# Patient Record
Sex: FEMALE | Race: OTHER
[De-identification: names, ages, dates, MRNs, and addresses within clinical notes are randomized per-mention and may not be internally consistent; named-entity substitution may affect disease eponyms.]

---

## 2021-01-16 ENCOUNTER — HOSPITAL ENCOUNTER (INPATIENT)
Dept: HOSPITAL 72 - EMR | Age: 86
LOS: 3 days | Discharge: HOME HEALTH | DRG: 177 | End: 2021-01-19
Payer: MEDICARE

## 2021-01-16 VITALS — SYSTOLIC BLOOD PRESSURE: 101 MMHG | DIASTOLIC BLOOD PRESSURE: 63 MMHG

## 2021-01-16 VITALS — DIASTOLIC BLOOD PRESSURE: 72 MMHG | SYSTOLIC BLOOD PRESSURE: 102 MMHG

## 2021-01-16 VITALS — HEIGHT: 63 IN | WEIGHT: 145 LBS | BODY MASS INDEX: 25.69 KG/M2

## 2021-01-16 VITALS — SYSTOLIC BLOOD PRESSURE: 109 MMHG | DIASTOLIC BLOOD PRESSURE: 67 MMHG

## 2021-01-16 VITALS — DIASTOLIC BLOOD PRESSURE: 83 MMHG | SYSTOLIC BLOOD PRESSURE: 131 MMHG

## 2021-01-16 VITALS — SYSTOLIC BLOOD PRESSURE: 106 MMHG | DIASTOLIC BLOOD PRESSURE: 61 MMHG

## 2021-01-16 VITALS — DIASTOLIC BLOOD PRESSURE: 61 MMHG | SYSTOLIC BLOOD PRESSURE: 110 MMHG

## 2021-01-16 DIAGNOSIS — J12.82: ICD-10-CM

## 2021-01-16 DIAGNOSIS — R09.02: ICD-10-CM

## 2021-01-16 DIAGNOSIS — G89.4: ICD-10-CM

## 2021-01-16 DIAGNOSIS — G93.40: ICD-10-CM

## 2021-01-16 DIAGNOSIS — E87.2: ICD-10-CM

## 2021-01-16 DIAGNOSIS — J45.909: ICD-10-CM

## 2021-01-16 DIAGNOSIS — I10: ICD-10-CM

## 2021-01-16 DIAGNOSIS — K85.90: ICD-10-CM

## 2021-01-16 DIAGNOSIS — I48.91: ICD-10-CM

## 2021-01-16 DIAGNOSIS — U07.1: Primary | ICD-10-CM

## 2021-01-16 LAB
ADD MANUAL DIFF: NO
ADD MANUAL DIFF: NO
ALBUMIN SERPL-MCNC: 3.6 G/DL (ref 3.4–5)
ALBUMIN/GLOB SERPL: 0.8 {RATIO} (ref 1–2.7)
ALP SERPL-CCNC: 83 U/L (ref 46–116)
ALT SERPL-CCNC: 30 U/L (ref 12–78)
ANION GAP SERPL CALC-SCNC: 6 MMOL/L (ref 5–15)
ANION GAP SERPL CALC-SCNC: 6 MMOL/L (ref 5–15)
APPEARANCE UR: CLEAR
APTT BLD: 27 SEC (ref 23–33)
APTT PPP: (no result) S
AST SERPL-CCNC: 49 U/L (ref 15–37)
BASOPHILS NFR BLD AUTO: 1.4 % (ref 0–2)
BASOPHILS NFR BLD AUTO: 2 % (ref 0–2)
BILIRUB SERPL-MCNC: 1 MG/DL (ref 0.2–1)
BUN SERPL-MCNC: 25 MG/DL (ref 7–18)
BUN SERPL-MCNC: 31 MG/DL (ref 7–18)
CALCIUM SERPL-MCNC: 8.9 MG/DL (ref 8.5–10.1)
CALCIUM SERPL-MCNC: 9 MG/DL (ref 8.5–10.1)
CHLORIDE SERPL-SCNC: 102 MMOL/L (ref 98–107)
CHLORIDE SERPL-SCNC: 96 MMOL/L (ref 98–107)
CK MB SERPL-MCNC: 0.9 NG/ML (ref 0–3.6)
CO2 SERPL-SCNC: 32 MMOL/L (ref 21–32)
CO2 SERPL-SCNC: 33 MMOL/L (ref 21–32)
CREAT SERPL-MCNC: 1.2 MG/DL (ref 0.55–1.3)
CREAT SERPL-MCNC: 1.3 MG/DL (ref 0.55–1.3)
EOSINOPHIL NFR BLD AUTO: 1.2 % (ref 0–3)
EOSINOPHIL NFR BLD AUTO: 4.2 % (ref 0–3)
ERYTHROCYTE [DISTWIDTH] IN BLOOD BY AUTOMATED COUNT: 13.5 % (ref 11.6–14.8)
ERYTHROCYTE [DISTWIDTH] IN BLOOD BY AUTOMATED COUNT: 13.8 % (ref 11.6–14.8)
GLOBULIN SER-MCNC: 4.4 G/DL
GLUCOSE UR STRIP-MCNC: NEGATIVE MG/DL
HCT VFR BLD CALC: 46.5 % (ref 37–47)
HCT VFR BLD CALC: 46.5 % (ref 37–47)
HGB BLD-MCNC: 14.6 G/DL (ref 12–16)
HGB BLD-MCNC: 15.5 G/DL (ref 12–16)
INR PPP: 1 (ref 0.9–1.1)
KETONES UR QL STRIP: NEGATIVE
LEUKOCYTE ESTERASE UR QL STRIP: NEGATIVE
LYMPHOCYTES NFR BLD AUTO: 28.2 % (ref 20–45)
LYMPHOCYTES NFR BLD AUTO: 35.4 % (ref 20–45)
MCV RBC AUTO: 94 FL (ref 80–99)
MCV RBC AUTO: 96 FL (ref 80–99)
MONOCYTES NFR BLD AUTO: 3.5 % (ref 1–10)
MONOCYTES NFR BLD AUTO: 9.8 % (ref 1–10)
NEUTROPHILS NFR BLD AUTO: 48.6 % (ref 45–75)
NEUTROPHILS NFR BLD AUTO: 65.7 % (ref 45–75)
NITRITE UR QL STRIP: NEGATIVE
PH UR STRIP: 5 [PH] (ref 4.5–8)
PLATELET # BLD: 169 K/UL (ref 150–450)
PLATELET # BLD: 200 K/UL (ref 150–450)
POTASSIUM SERPL-SCNC: 3.8 MMOL/L (ref 3.5–5.1)
POTASSIUM SERPL-SCNC: 4.9 MMOL/L (ref 3.5–5.1)
PROT UR QL STRIP: NEGATIVE
RBC # BLD AUTO: 4.85 M/UL (ref 4.2–5.4)
RBC # BLD AUTO: 4.94 M/UL (ref 4.2–5.4)
SODIUM SERPL-SCNC: 135 MMOL/L (ref 136–145)
SODIUM SERPL-SCNC: 140 MMOL/L (ref 136–145)
SP GR UR STRIP: 1.01 (ref 1–1.03)
UROBILINOGEN UR-MCNC: NORMAL MG/DL (ref 0–1)
WBC # BLD AUTO: 4.5 K/UL (ref 4.8–10.8)
WBC # BLD AUTO: 8.5 K/UL (ref 4.8–10.8)

## 2021-01-16 PROCEDURE — 83605 ASSAY OF LACTIC ACID: CPT

## 2021-01-16 PROCEDURE — 93005 ELECTROCARDIOGRAM TRACING: CPT

## 2021-01-16 PROCEDURE — 96375 TX/PRO/DX INJ NEW DRUG ADDON: CPT

## 2021-01-16 PROCEDURE — 96365 THER/PROPH/DIAG IV INF INIT: CPT

## 2021-01-16 PROCEDURE — 83690 ASSAY OF LIPASE: CPT

## 2021-01-16 PROCEDURE — 96361 HYDRATE IV INFUSION ADD-ON: CPT

## 2021-01-16 PROCEDURE — 99285 EMERGENCY DEPT VISIT HI MDM: CPT

## 2021-01-16 PROCEDURE — 36415 COLL VENOUS BLD VENIPUNCTURE: CPT

## 2021-01-16 PROCEDURE — 74177 CT ABD & PELVIS W/CONTRAST: CPT

## 2021-01-16 PROCEDURE — 80048 BASIC METABOLIC PNL TOTAL CA: CPT

## 2021-01-16 PROCEDURE — 84443 ASSAY THYROID STIM HORMONE: CPT

## 2021-01-16 PROCEDURE — 83880 ASSAY OF NATRIURETIC PEPTIDE: CPT

## 2021-01-16 PROCEDURE — 85025 COMPLETE CBC W/AUTO DIFF WBC: CPT

## 2021-01-16 PROCEDURE — 82553 CREATINE MB FRACTION: CPT

## 2021-01-16 PROCEDURE — 81003 URINALYSIS AUTO W/O SCOPE: CPT

## 2021-01-16 PROCEDURE — 85610 PROTHROMBIN TIME: CPT

## 2021-01-16 PROCEDURE — 83735 ASSAY OF MAGNESIUM: CPT

## 2021-01-16 PROCEDURE — 85730 THROMBOPLASTIN TIME PARTIAL: CPT

## 2021-01-16 PROCEDURE — 84484 ASSAY OF TROPONIN QUANT: CPT

## 2021-01-16 PROCEDURE — 96367 TX/PROPH/DG ADDL SEQ IV INF: CPT

## 2021-01-16 PROCEDURE — 87040 BLOOD CULTURE FOR BACTERIA: CPT

## 2021-01-16 PROCEDURE — 80053 COMPREHEN METABOLIC PANEL: CPT

## 2021-01-16 PROCEDURE — 70450 CT HEAD/BRAIN W/O DYE: CPT

## 2021-01-16 PROCEDURE — 80307 DRUG TEST PRSMV CHEM ANLYZR: CPT

## 2021-01-16 PROCEDURE — 71045 X-RAY EXAM CHEST 1 VIEW: CPT

## 2021-01-16 RX ADMIN — APIXABAN SCH MG: 5 TABLET, FILM COATED ORAL at 18:24

## 2021-01-16 RX ADMIN — METOPROLOL SUCCINATE SCH MG: 25 TABLET, EXTENDED RELEASE ORAL at 10:02

## 2021-01-16 RX ADMIN — APIXABAN SCH MG: 5 TABLET, FILM COATED ORAL at 10:02

## 2021-01-16 NOTE — NUR
NURSE NOTES:

Received pt from [facility]. pt is resting comfortably no acute distress, call light w/in 
reach.

## 2021-01-16 NOTE — NUR
NURSE HAND-OFF: 



Important Events on Shift:admission

Patient Status: stable

Diet: reg,soft



Pending Orders: 

Pending Results/Labs:am labs

Pending MD notification:



Latest Vital Signs: Temperature 97.2 , Pulse 90 , B/P 106 /61 , Respiratory Rate 18 , O2 SAT 
97 , Room Air, O2 Flow Rate .  

Vital Sign Comment: []



Latest Plummer Fall Score: 15  

Fall Risk: Low Risk 

Safety Measures: Call light Within Reach, Bed Alarm Zone 1, Side Rails Side Rails x2, Bed 
position Low and Locked.

Fall Precautions: 

Yellow Socks

Yellow Gown

Door Sign

Patient Fall Education



Report given to [Jose].

## 2021-01-16 NOTE — DIAGNOSTIC IMAGING REPORT
EXAM:

  XR Chest, 1 View

 

CLINICAL HISTORY:

  AMS

 

TECHNIQUE:

  Frontal view of the chest.

 

COMPARISON:

  No relevant prior studies available.

 

FINDINGS:

  

Borderline cardiac enlargement.  Patchy densities right lung base 

indeterminate for atelectasis or pneumonia.  Follow-up to resolution 

recommended.  Recommend upright PA and lateral views of the chest when 

clinically feasible.  No evidence of pneumothorax or significant pleural 

fluid collections.

## 2021-01-16 NOTE — DIAGNOSTIC IMAGING REPORT
EXAM:

  CT Head Without Intravenous Contrast

 

CLINICAL HISTORY:

  AMS

 

TECHNIQUE:

  Axial computed tomography images of the head/brain without intravenous 

contrast.  CTDI is 53.40 mGy and DLP is 992.10 mGy-cm.  One or more of 

the following dose reduction techniques were used: automated exposure 

control, adjustment of the mA and/or kV according to patient size, use of 

iterative reconstruction technique.

 

COMPARISON:

  No relevant prior studies available.

 

FINDINGS:

  Brain:  Low-attenuation in the periventricular white matter likely 

chronic small vessel disease.  No evidence of mass-effect or intracranial 

hemorrhage.

  Ventricles:  Unremarkable.  No ventriculomegaly.

  Bones/joints:  Unremarkable.  No acute fracture.

  Soft tissues:  Unremarkable.

  Sinuses:  Unremarkable as visualized.  No acute sinusitis.

  Mastoid air cells:  Unremarkable as visualized.  No mastoid effusion.

  Other findings:  Motion degraded study.

 

IMPRESSION:     

  No acute intracranial findings

## 2021-01-16 NOTE — NUR
NURSE NOTES:

Received report from [facility]. Pt arrived @ 0330 via cammie, on RA, ambulatory. AAO x 
2-3, communication barrier. Denies pain or SOB at this time. Home meds and belongings 
reviewed. IV in place and patent. Orientation to the room given. Left message Dr. Osorio 
for admission order. Bed locked, lowest position, alarm on, side rails up, call light within 
reach. Will continue to monitor.

## 2021-01-16 NOTE — NUR
NURSE NOTES:

Received patient on bed, awake. on room air, sating 94%. denies any pain or discomfort. iv 
access on left wrist running ivf as ordered. per RISHABH porter, " she took out iv access twice 
when she goes to the restroom".ambulates. reiterated to call and ask for assistance to 
prevent fall or injury. kept head of bed, elevated. bed locked and in lowest position. bed 
alarm on call light and light button within easy reach. will continue plan of care.

## 2021-01-16 NOTE — DIAGNOSTIC IMAGING REPORT
EXAM:

  CT Abdomen and Pelvis With Intravenous Contrast

 

CLINICAL HISTORY:

  PAIN

 

TECHNIQUE:

  Axial computed tomography images of the abdomen and pelvis with 

intravenous contrast.  CTDI is 5.7 mGy and DLP is 279.1 mGy-cm.  One or 

more of the following dose reduction techniques were used: automated 

exposure control, adjustment of the mA and/or kV according to patient 

size, use of iterative reconstruction technique.

 

COMPARISON:

  No relevant prior studies available.

 

FINDINGS:

  Artifacts:  Motion degraded study.

  Lung bases:  At least 2 subcentimeter pulmonary nodules in the superior 

portion of the right upper lobe with the largest measuring approximately 

3 mm.  Of unknown etiology or clinical significance in this elderly 

patient.  Indeterminate low-attenuation lesion lateral periphery of the 

inferior right hepatic lobe measuring 22 x 29 mm.  Finding does not meet 

criteria for a simple cyst.  At least 2 additional subcentimeter low-

attenuation foci in the right hepatic lobe.  A 12 mm low-attenuation 

circumscribed focus posterior left hepatic lobe.  Recommend consideration 

of nonemergent hepatic protocol CT or MRI.

 

 ABDOMEN:

  Liver:  See above.

  Gallbladder and bile ducts:  Unremarkable.  No calcified stones.  No 

ductal dilation.

  Pancreas:  Moderate pancreatic ductal dilatation measuring up to 7 mm.  

Mass of the pancreas is not identified.  Pancreatic protocol MRI can 

further evaluate.

  Spleen:  Unremarkable.  No splenomegaly.

  Adrenals:  Unremarkable.  No mass.

  Kidneys and ureters:  Nonobstructing right lower renal pole calculus.

  Stomach and bowel:  Diverticulosis of the colon without associated 

adjacent inflammatory changes.  Negative for obstruction or pneumatosis.  

No mucosal thickening.

 

 PELVIS:

  Appendix:  The appendix is normal.

  Bladder:  Unremarkable.  No mass.

  Reproductive:  Unremarkable as visualized.

 

 ABDOMEN and PELVIS:

  Intraperitoneal space:  Unremarkable.  No free air.  No significant 

fluid collection.

  Bones/joints:  No acute fracture.  No dislocation.

  Soft tissues:  Unremarkable.

  Vasculature:  Unremarkable.  No abdominal aortic aneurysm.

  Lymph nodes:  Unremarkable.  No enlarged lymph nodes.

 

IMPRESSION:     

  No acute findings.  Indeterminate hepatic lesions with follow-up as 

above.  There is also indeterminate prominent pancreatic ductal 

dilatation as above.

## 2021-01-16 NOTE — NUR
TRANSFER TO FLOOR:

Patient transferred to  as ordered, per ER MD.   Report given to Cathryn KEATING.  
Belongings and medications sent with pt. Vitals are stable on RA.

## 2021-01-16 NOTE — EMERGENCY ROOM REPORT
History of Present Illness


General


Chief Complaint:  Altered Mental Status


Source:  Patient





Present Illness


HPI


89-year-old female with history of atrial fibrillation and chronic abdominal 

pain here with altered mental status.  Patient takes tramadol and methadone 

prescribed for abdominal pain.  Also takes Eliquis and metoprolol.  According to

family the patient has been lethargic and altered for the past several hours.  

They state that she did take her pain medications tonight.  Patient is awake but

drowsy.  She is complaining of abdominal pain that is consistent with her 

chronic abdominal pain.  Denies any headache, vision changes, neck pain, neck 

stiffness, fevers, chills, chest pain, palpitation, shortness of breath, back 

pain, nausea, vomiting, diarrhea, dysuria.


Allergies:  


Coded Allergies:  


     No Known Allergies (Unverified , 1/16/21)





COVID-19 Screening


Contact w/high risk pt:  No


Experienced COVID-19 symptoms?:  No


COVID-19 Testing performed PTA:  No





Nursing Documentation-PMH


Hx Hypertension:  Yes


Hx Asthma:  Yes





Review of Systems


All Other Systems:  negative except mentioned in HPI





Physical Exam





Vital Signs








  Date Time  Temp Pulse Resp B/P (MAP) Pulse Ox O2 Delivery O2 Flow Rate FiO2


 


1/16/21 00:11 98.4 105 12 131/83 (99) 98 Room Air  








Sp02 EP Interpretation:  reviewed, normal


General Appearance:  no apparent distress, non-toxic, other - Slightly drowsy 

but is arousable answers questions appropriately


Head:  normocephalic, atraumatic


Eyes:  bilateral eye normal inspection, bilateral eye PERRL


ENT:  hearing grossly normal, normal pharynx, no angioedema, normal voice


Neck:  full range of motion, supple/symm/no masses


Respiratory:  chest non-tender, lungs clear, normal breath sounds, speaking full

sentences


Cardiovascular #1:  regular rate, rhythm, no edema


Cardiovascular #2:  2+ carotid (R), 2+ carotid (L), 2+ radial (R), 2+ radial 

(L), 2+ dorsalis pedis (R), 2+ dorsalis pedis (L)


Gastrointestinal:  normal bowel sounds, soft, non-distended, no guarding, no 

rebound, other - Mild epigastric abdominal tenderness on palpation without any 

rebound or guarding or distention


Rectal:  deferred


Genitourinary:  normal inspection, no CVA tenderness


Musculoskeletal:  back normal, normal range of motion, gait/station normal, non-

tender


Neurologic:  alert, motor strength/tone normal, oriented x3, sensory intact, 

responsive, speech normal


Psychiatric:  judgement/insight normal, memory normal, mood/affect normal, no 

suicidal/homicidal ideation


Lymphatic:  no adenopathy





Medical Decision Making


Diagnostic Impression:  


   Primary Impression:  


   Altered mental status


   Additional Impressions:  


   Pneumonia


   Afib


ER Course


Chest x-ray: Patchy densities right lung base indeterminate for atelectasis or 

pneumonia.  Borderline cardiac enlargement.  No pneumothorax or pleural effusion





EKG: Atrial fibrillation, rate 102 bpm.  Rightward axis.  No ischemia, intervals

WNL. No ectopy


Rhythm strip: patient monitored for arrhythmias - no malignant dysrhythmias, 

runs of PVCs, nor pauses noted





CT head: No acute intracranial findings





CT abdomen pelvis: No acute findings.  Indeterminate hepatic lesions with 

follow-up as above.  There is also indeterminant prominent pancreatic ductal 

dilatation as above








Laboratory Tests








Test


 1/16/21


00:20 1/16/21


00:50


 


White Blood Count


 8.5 K/UL


(4.8-10.8) 





 


Red Blood Count


 4.94 M/UL


(4.20-5.40) 





 


Hemoglobin


 15.5 G/DL


(12.0-16.0) 





 


Hematocrit


 46.5 %


(37.0-47.0) 





 


Mean Corpuscular Volume 94 FL (80-99)   


 


Mean Corpuscular Hemoglobin


 31.4 PG


(27.0-31.0)  H 





 


Mean Corpuscular Hemoglobin


Concent 33.4 G/DL


(32.0-36.0) 





 


Red Cell Distribution Width


 13.5 %


(11.6-14.8) 





 


Platelet Count


 200 K/UL


(150-450) 





 


Mean Platelet Volume


 9.5 FL


(6.5-10.1) 





 


Neutrophils (%) (Auto)


 48.6 %


(45.0-75.0) 





 


Lymphocytes (%) (Auto)


 35.4 %


(20.0-45.0) 





 


Monocytes (%) (Auto)


 9.8 %


(1.0-10.0) 





 


Eosinophils (%) (Auto)


 4.2 %


(0.0-3.0)  H 





 


Basophils (%) (Auto)


 2.0 %


(0.0-2.0) 





 


Prothrombin Time


 11.3 SEC


(9.30-11.50) 





 


Prothrombin Time INR 1.0 (0.9-1.1)   


 


Activated Partial


Thromboplast Time 27 SEC (23-33)


 





 


Sodium Level


 135 MMOL/L


(136-145)  L 





 


Potassium Level


 4.9 MMOL/L


(3.5-5.1) 





 


Chloride Level


 96 MMOL/L


()  L 





 


Carbon Dioxide Level


 33 MMOL/L


(21-32)  H 





 


Anion Gap


 6 mmol/L


(5-15) 





 


Blood Urea Nitrogen


 31 mg/dL


(7-18)  H 





 


Creatinine


 1.3 MG/DL


(0.55-1.30) 





 


Estimated Glomerular


Filtration Rate 38.6 mL/min


(>60) 





 


Glucose Level


 120 MG/DL


()  H 





 


Lactic Acid Level


 2.40 mmol/L


(0.4-2.0)  H 





 


Calcium Level


 8.9 MG/DL


(8.5-10.1) 





 


Magnesium Level


 2.1 MG/DL


(1.8-2.4) 





 


Total Bilirubin


 1.0 MG/DL


(0.2-1.0) 





 


Aspartate Amino Transferase


(AST) 49 U/L (15-37)


H 





 


Alanine Aminotransferase (ALT)


 30 U/L (12-78)


 





 


Alkaline Phosphatase


 83 U/L


() 





 


Creatine Kinase MB


 0.9 NG/ML


(0.0-3.6) 





 


Troponin I


 0.004 ng/mL


(0.000-0.056) 





 


Pro-B-Type Natriuretic Peptide


 796 pg/mL


(0-125)  H 





 


Total Protein


 8.0 G/DL


(6.4-8.2) 





 


Albumin


 3.6 G/DL


(3.4-5.0) 





 


Globulin 4.4 g/dL   


 


Albumin/Globulin Ratio


 0.8 (1.0-2.7)


L 





 


Lipase


 680 U/L


()  H 





 


Urine Color  Pale yellow  


 


Urine Appearance  Clear  


 


Urine pH  5 (4.5-8.0)  


 


Urine Specific Gravity


 


 1.010


(1.005-1.035)


 


Urine Protein


 


 Negative


(NEGATIVE)


 


Urine Glucose (UA)


 


 Negative


(NEGATIVE)


 


Urine Ketones


 


 Negative


(NEGATIVE)


 


Urine Blood


 


 Negative


(NEGATIVE)


 


Urine Nitrite


 


 Negative


(NEGATIVE)


 


Urine Bilirubin


 


 Negative


(NEGATIVE)


 


Urine Urobilinogen


 


 Normal MG/DL


(0.0-1.0)


 


Urine Leukocyte Esterase


 


 Negative


(NEGATIVE)


 


Urine Opiates Screen


 


 Negative


(NEGATIVE)


 


Urine Barbiturates Screen


 


 Negative


(NEGATIVE)


 


Phencyclidine (PCP) Screen


 


 Negative


(NEGATIVE)


 


Urine Amphetamines Screen


 


 Negative


(NEGATIVE)


 


Urine Benzodiazepines Screen


 


 Negative


(NEGATIVE)


 


Urine Cocaine Screen


 


 Negative


(NEGATIVE)


 


Urine Marijuana (THC) Screen


 


 Negative


(NEGATIVE)








89-year-old female here with drowsiness.  Review of the patient's cures shows 

that the patient frequently takes Norco and methadone multiple times a day for 

her chronic abdominal pain.  Patient was mildly drowsy on arrival to the 

emergency department but after several hours this resolved without any acute 

intervention.  Patient had a mildly elevated lactate of 2.4.  Chest x-ray also 

showed a right lower lobe infiltrate concerning for pneumonia.  Patient was 

given a full 30 cc/kg fluid bolus and was started on ceftriaxone and 

azithromycin for community-acquired pneumonia.  Patient has not recently been 

admitted to a hospital.  She has a history of atrial fibrillation on Eliquis.  

EKG showed atrial fibrillation with a normal heart rate.  Patient was found to 

be post Covid positive.  Was given Decadron.  Had normal oxygen saturation and 

otherwise normal vital signs throughout her stay in the emergency department.  

Patient had normal vital signs throughout her stay in the emergency department. 

To be admitted to [facility].





Last Vital Signs








  Date Time  Temp Pulse Resp B/P (MAP) Pulse Ox O2 Delivery O2 Flow Rate FiO2


 


1/16/21 00:11 98.4 105 12 131/83 (99) 98 Room Air  

















Eugene Gates M.D.                Jan 16, 2021 00:18

## 2021-01-16 NOTE — HISTORY & PHYSICAL
History and Physical


History & Physicial


89-year-old female with history of atrial fibrillation presents with lethargy 

and ALOC.  Patient awake and comfortable when seen.  She has chronic abdominal 

pain.  Denies any headache, vision changes, neck pain, neck stiffness, fevers, 

chills, chest pain, palpitation, shortness of breath, back pain, nausea, v

omiting, diarrhea, dysuria. COVID + in ER








PMH hypertension and Asthma, atrial fibrillation,chronic pain syndrome





MEDS/ALLERGIES: reviewed and reconciled


SOCIAL HISTORY: nonsmoker nondrinker


PHYSICAL


deferred due to COVID





Laboratory Tests


1/16/21 00:20: 


White Blood Count 8.5, Red Blood Count 4.94, Hemoglobin 15.5, Hematocrit 46.5, 

Mean Corpuscular Volume 94, Mean Corpuscular Hemoglobin 31.4H, Mean Corpuscular 

Hemoglobin Concent 33.4, Red Cell Distribution Width 13.5, Platelet Count 200, 

Mean Platelet Volume 9.5, Neutrophils (%) (Auto) 48.6, Lymphocytes (%) (Auto) 

35.4, Monocytes (%) (Auto) 9.8, Eosinophils (%) (Auto) 4.2H, Basophils (%) 

(Auto) 2.0, Prothrombin Time 11.3, Prothromb Time International Ratio 1.0, 

Activated Partial Thromboplast Time 27, Sodium Level 135L, Potassium Level 4.9, 

Chloride Level 96L, Carbon Dioxide Level 33H, Anion Gap 6, Blood Urea Nitrogen 

31H, Creatinine 1.3, Estimat Glomerular Filtration Rate 38.6, Glucose Level 120H

, Lactic Acid Level 2.40H, Calcium Level 8.9, Magnesium Level 2.1, Total 

Bilirubin 1.0, Aspartate Amino Transf (AST/SGOT) 49H, Alanine Aminotransferase 

(ALT/SGPT) 30, Alkaline Phosphatase 83, Creatine Kinase MB 0.9, Troponin I 

0.004, Pro-B-Type Natriuretic Peptide 796H, Total Protein 8.0, Albumin 3.6, 

Globulin 4.4, Albumin/Globulin Ratio 0.8L, Lipase 680H


1/16/21 00:50: 


Urine Color Pale yellow, Urine Appearance Clear, Urine pH 5, Urine Specific 

Gravity 1.010, Urine Protein Negative, Urine Glucose (UA) Negative, Urine 

Ketones Negative, Urine Blood Negative, Urine Nitrite Negative, Urine Bilirubin 

Negative, Urine Urobilinogen Normal, Urine Leukocyte Esterase Negative, Urine Op

iates Screen Negative, Urine Barbiturates Screen Negative, Phencyclidine (PCP) 

Screen Negative, Urine Amphetamines Screen Negative, Urine Benzodiazepines 

Screen Negative, Urine Cocaine Screen Negative, Urine Marijuana (THC) Screen 

Negative


1/16/21 03:00: Lactic Acid Level 1.00


1/16/21 08:20: 


White Blood Count 4.5L, Red Blood Count 4.85, Hemoglobin 14.6, Hematocrit 46.5, 

Mean Corpuscular Volume 96, Mean Corpuscular Hemoglobin 30.1, Mean Corpuscular 

Hemoglobin Concent 31.4L, Red Cell Distribution Width 13.8, Platelet Count 169, 

Mean Platelet Volume 8.2, Neutrophils (%) (Auto) 65.7, Lymphocytes (%) (Auto) 

28.2, Monocytes (%) (Auto) 3.5, Eosinophils (%) (Auto) 1.2, Basophils (%) (Auto)

1.4, Sodium Level 140, Potassium Level 3.8, Chloride Level 102, Carbon Dioxide 

Level 32, Anion Gap 6, Blood Urea Nitrogen 25H, Creatinine 1.2, Estimat 

Glomerular Filtration Rate 42.3, Glucose Level 152H, Calcium Level 9.0








IMPRESSION


COVID pneumonia


hypertension


Asthma


? pancreatitis


elevated BNP


lactic acidemia


ho Atrial fib








PLAN


Decadron


consider GI


repeat amylase in am


ID to see


oxygen as needed


monitor oxygen needs


DVT prophylaxis


home meds


nutrition as able


monitor labs and imaging











Sylvester Osorio MD           Jan 16, 2021 15:43

## 2021-01-16 NOTE — NUR
ED Nurse Note: Pt BIBA R[facility]vin[facility] from home, Per EMS report pt OD on methadone 
unknown amount. No medications given in field. Upon arrival pt is awake and 
drowsy, unable to assess orientation pt does not completely answer questions, 
cooperative with care able to follow directions. Pt is breathing even and 
unlabored. VSS on monitor.

## 2021-01-17 VITALS — SYSTOLIC BLOOD PRESSURE: 120 MMHG | DIASTOLIC BLOOD PRESSURE: 82 MMHG

## 2021-01-17 VITALS — SYSTOLIC BLOOD PRESSURE: 99 MMHG | DIASTOLIC BLOOD PRESSURE: 57 MMHG

## 2021-01-17 VITALS — DIASTOLIC BLOOD PRESSURE: 79 MMHG | SYSTOLIC BLOOD PRESSURE: 115 MMHG

## 2021-01-17 VITALS — SYSTOLIC BLOOD PRESSURE: 145 MMHG | DIASTOLIC BLOOD PRESSURE: 78 MMHG

## 2021-01-17 VITALS — DIASTOLIC BLOOD PRESSURE: 63 MMHG | SYSTOLIC BLOOD PRESSURE: 117 MMHG

## 2021-01-17 RX ADMIN — LORAZEPAM PRN MG: 1 TABLET ORAL at 09:40

## 2021-01-17 RX ADMIN — DEXAMETHASONE SODIUM PHOSPHATE SCH MG: 10 INJECTION INTRAMUSCULAR; INTRAVENOUS at 08:45

## 2021-01-17 RX ADMIN — APIXABAN SCH MG: 5 TABLET, FILM COATED ORAL at 19:30

## 2021-01-17 RX ADMIN — LORAZEPAM PRN MG: 1 TABLET ORAL at 20:20

## 2021-01-17 RX ADMIN — METOPROLOL SUCCINATE SCH MG: 25 TABLET, EXTENDED RELEASE ORAL at 08:32

## 2021-01-17 RX ADMIN — LORAZEPAM PRN MG: 1 TABLET ORAL at 14:01

## 2021-01-17 RX ADMIN — SODIUM CHLORIDE SCH MLS/HR: 0.9 INJECTION INTRAVENOUS at 01:08

## 2021-01-17 RX ADMIN — APIXABAN SCH MG: 5 TABLET, FILM COATED ORAL at 08:33

## 2021-01-17 NOTE — NUR
nurse notes

transferred to  411, for safety

refused IVF 

Notified daughter made aware regarding patient condition

left message to PMD regarding  patient condition,



chadwick mix

## 2021-01-17 NOTE — NUR
NURSE NOTES:

failed to hang ivf. attempted x2.  patient is restless. moves around her upper extremities 
and kicks on the bed. reality orientation rendered. provided a calm environment and decrease 
stimuli.

## 2021-01-17 NOTE — PULMONOLOGY PROGRESS NOTE
Subjective


ROS Limited/Unobtainable:  No


Allergies:  


Coded Allergies:  


     No Known Allergies (Unverified , 1/16/21)


All Systems:  reviewed and negative except above


Subjective


agitated


noncooperative


walking around





Objective





Last 24 Hour Vital Signs








  Date Time  Temp Pulse Resp B/P (MAP) Pulse Ox O2 Delivery O2 Flow Rate FiO2


 


1/17/21 12:00 97.6 88 17 115/79 (91) 96   


 


1/17/21 09:40  94 19 117/63 96   


 


1/17/21 08:32  94  117/63    


 


1/17/21 08:20 98.7 102 19 145/78 (100) 96   


 


1/17/21 08:20      Nasal Cannula 2.0 


 


1/17/21 04:00 98.1 94 20 117/63 (81) 96   


 


1/17/21 00:00 97.7 86 19 99/57 (71) 95   


 


1/16/21 21:00      Nasal Cannula 2.0 


 


1/16/21 20:00 97.9 93 18 101/63 (76) 95   


 


1/16/21 15:56 97.3 90 18 109/67 (81) 93   

















Intake and Output  


 


 1/16/21 1/17/21





 19:00 07:00


 


Intake Total 800 ml 


 


Balance 800 ml 


 


  


 


Intake Oral 800 ml 


 


# Voids  2








Objective


deferred due to COVID





Microbiology








 Date/Time


Source Procedure


Growth Status





 


 1/16/21 02:00


Nasopharynx SARS-CoV-2 RdRp Gene Assay - Final Complete


 


 1/16/21 00:20


Blood Blood Culture - Preliminary


NO GROWTH AFTER 24 HOURS Resulted


 


 1/16/21 00:15


Blood Blood Culture - Preliminary


NO GROWTH AFTER 24 HOURS Resulted











Current Medications








 Medications


  (Trade)  Dose


 Ordered  Sig/Keke


 Route


 PRN Reason  Start Time


 Stop Time Status Last Admin


Dose Admin


 


 Acetaminophen


  (Tylenol)  650 mg  Q4H  PRN


 ORAL


 For Pain  1/16/21 07:15


 2/15/21 07:14   





 


 Al Hydroxide/Mg


 Hydroxide


  (Mylanta)  30 ml  Q4H  PRN


 ORAL


 STOMACH UPSET  1/16/21 07:15


 2/15/21 07:14   





 


 Apixaban


  (Eliquis)  5 mg  BID


 ORAL


   1/16/21 09:00


 4/16/21 08:59  1/17/21 08:33





 


 Ceftriaxone


 Sodium 1 gm/


 Dextrose  55 ml @ 


 110 mls/hr  Q24H


 IVPB


   1/17/21 02:00


 1/24/21 01:59  1/17/21 01:08





 


 Dexamethasone


 Sodium Phosphate


  (Decadron 10mg/


 ml Inj)  6 mg  DAILY


 IV


   1/17/21 09:00


 1/25/21 09:01  1/17/21 08:45





 


 Iohexol


  (OMNIPAQUE-300


 100ml)  100 ml  NOW  PRN


 INJ


 Radiology Procedure  1/16/21 00:30


 1/18/21 00:29   





 


 Lorazepam


  (Ativan)  1 mg  Q4H  PRN


 ORAL


 For Anxiety  1/17/21 09:30


 1/24/21 09:29  1/17/21 09:40





 


 Methadone HCl


  (Methadone HCl)  10 mg  DAILY


 ORAL


   1/16/21 09:00


 1/23/21 08:59  1/17/21 08:33





 


 Metoprolol


 Succinate


  (Toprol XL)  25 mg  DAILY


 ORAL


   1/16/21 09:00


 4/16/21 08:59  1/17/21 08:32





 


 Pantoprazole


  (Protonix)  40 mg  DAILY


 ORAL


   1/16/21 09:00


 2/15/21 08:59  1/17/21 08:32





 


 Sodium Chloride  1,000 ml @ 


 100 mls/hr  Q10H


 IV


   1/16/21 07:15


 2/15/21 07:14  1/17/21 03:43














Assessment/Plan


Assessment/Plan





IMPRESSION


COVID pneumonia


hypertension


Asthma


? pancreatitis


elevated BNP


lactic acidemia


ho Atrial fib








PLAN


Decadron


consider GI pending repeat labs


repeat amylase and lipase


ID follow up 


oxygen as needed


monitor oxygen needs


DVT prophylaxis


home meds


nutrition as able


monitor labs and imaging


ativan PRN 


sitter


impression, plan, and exam edited and reviewed in detail


care discussed with Sylvester Sprague MD           Jan 17, 2021 13:40

## 2021-01-17 NOTE — NUR
nurse notes

patient very agitated, confused, pulling HL, water all over the floor nearly fall,  holding 
her water pitcher, notified Dr Osorio awaiting for his order



chadwick mix

## 2021-01-17 NOTE — NUR
NURSE HAND-OFF: 



Important Events on Shift:[on bilateral soft wrist restraint, ]

Patient Status: [no change]

Diet: [regular soft easy  chew]



Pending Orders: [labs ]

Pending Results/Labs:[none]

Pending MD notification:[none]



Latest Vital Signs: Temperature 97.6 , Pulse 88 , B/P 115 /79 , Respiratory Rate 17 , O2 SAT 
96 , Room Air, O2 Flow Rate 2.0 .  

Vital Sign Comment: [stable]



Latest Plummer Fall Score: 60  

Fall Risk: High Risk 

Safety Measures: Call light Within Reach, Bed Alarm Zone 1, Side Rails Side Rails x2, Bed 
position Low and Locked.

Fall Precautions: 

Yellow Socks

Yellow Gown

Door Sign

Patient Fall Education



Report given to [Ms Akshat RN].

## 2021-01-17 NOTE — NUR
nurse notes

still patient very agitated inspite of giving ativan and  have a sitter but still patient 
wants to go  outside her room, notified Dr Osorio , with order lets try putting her with 
bilateral soft wrist restraint



maggarao

-------------------------------------------------------------------------------

Addendum: 01/17/21 at 1917 by CHARLOTTE MASON RN RN

-------------------------------------------------------------------------------

patient stilll agitated despite of all those preventing measures ,taking ativan and  have a 
sitter, still  wants to go  outside of her room, notified Dr Osorio , with order ''lets try 
putting her with vest or bilateral soft wrist restraint, bilateral soft wrist restraint 
applied



dominguez

## 2021-01-17 NOTE — NUR
NURSE NOTES

Patient very agitated ativan given as ordered, post 30 min patient less agitation noted



chadwick mix

## 2021-01-17 NOTE — NUR
NURSE NOTES:

Received patient on bed, asleep. on room air, sating 96%. no facial grimacing. or moaning 
noted.  iv access on left wrist. per RISHABH guo, " patient is confused and walks around inside 
her room and hallway." noted with bilateral soft wrist restraints, no injury noted.  bed 
locked and in lowest position. bed alarm on. call light and light button within easy reach. 
will continue plan of care.

## 2021-01-17 NOTE — NUR
NURSE NOTES;

went to the room. patient is sitting on the edge of the bed, trying to remove her gown. 
bilateral soft wrist  restraints are off.  assisted patient to go back to bed. put back 
restraints. reality orientation rendered. offered fluids. ativan given as ordered.  bed 
locked  and in lowest position. call light and light button within easy reach. bed alarm on.

## 2021-01-17 NOTE — NUR
NURSE NOTES:

Received patient sitting on the edge of the  bed, awake,confused,  on room air,no sign of 
distress, RA, HL  patent, not hooked to IVF, Patient saying she wants to go home, and asking 
where is her money, reality orientation provided. reiterated to call and ask for assistance 
to prevent fall or injury. kept head of bed, elevated. bed locked and in lowest position. 
bed alarm on call light and light button within easy reach. will continue plan of care. 



chadwick mix

## 2021-01-17 NOTE — GENERAL PROGRESS NOTE
Subjective


ROS Limited/Unobtainable:  No


Constitutional:  Reports: malaise, weakness


HEENT:  Reports: no symptoms


Cardiovascular:  Reports: no symptoms


Respiratory:  Reports: no symptoms


Gastrointestinal/Abdominal:  Reports: no symptoms


Genitourinary:  Reports: no symptoms


Neurologic/Psychiatric:  Reports: no symptoms


Endocrine:  Reports: no symptoms


Hematologic/Lymphatic:  Reports: no symptoms


Allergies:  


Coded Allergies:  


     No Known Allergies (Unverified , 1/16/21)


All Systems:  reviewed and negative except above


Subjective


no complaints. confused. states shes going home. ambulating around the room. 

sats nml on room air





Objective





Last 24 Hour Vital Signs








  Date Time  Temp Pulse Resp B/P (MAP) Pulse Ox O2 Delivery O2 Flow Rate FiO2


 


1/17/21 08:32  94  117/63    


 


1/17/21 08:20 98.7 102 19 145/78 (100) 96   


 


1/17/21 08:20      Nasal Cannula 2.0 


 


1/17/21 04:00 98.1 94 20 117/63 (81) 96   


 


1/17/21 00:00 97.7 86 19 99/57 (71) 95   


 


1/16/21 21:00      Nasal Cannula 2.0 


 


1/16/21 20:00 97.9 93 18 101/63 (76) 95   


 


1/16/21 15:56 97.3 90 18 109/67 (81) 93   


 


1/16/21 12:00 97.0 80 18 102/72 (82) 94   


 


1/16/21 10:02  81  110/62    

















Intake and Output  


 


 1/16/21 1/17/21





 19:00 07:00


 


Intake Total 800 ml 


 


Balance 800 ml 


 


  


 


Intake Oral 800 ml 


 


# Voids  2








Height (Feet):  5


Height (Inches):  3.00


Weight (Pounds):  145


General Appearance:  WD/WN, alert


Neck:  supple


Cardiovascular:  normal rate, regular rhythm


Respiratory/Chest:  chest wall non-tender, lungs clear, normal breath sounds, no

respiratory distress, no accessory muscle use


Abdomen:  normal bowel sounds, non tender, soft, no organomegaly


Edema:  no edema noted Arm (L), no edema noted Arm (R), no edema noted Leg (L), 

no edema noted Leg (R)


Neurologic:  alert, responsive





Assessment/Plan


Problem List:  


(1) COVID-19


ICD Codes:  U07.1 - COVID-19


SNOMED:  303341240


(2) Altered mental status


ICD Codes:  R41.82 - Altered mental status, unspecified


SNOMED:  979346890


(3) Pneumonia


ICD Codes:  J18.9 - Pneumonia, unspecified organism


SNOMED:  554679006


(4) Afib


ICD Codes:  I48.91 - Unspecified atrial fibrillation


SNOMED:  21743237


Status:  stable


Assessment/Plan:


o2 as needed


steroids


ID eval


?dc methaone


eliquis


monitor for bleeding


pt/ot











Seun Angela MD              Jan 17, 2021 09:31

## 2021-01-17 NOTE — NUR
NURSE HAND-OFF: 



Important Events on Shif:

Patient Status:stable

Diet: regular soft easy chew



Pending Orders: 

Pending Results/Labs:

Pending MD notification:



Latest Vital Signs: Temperature 98.1 , Pulse 94 , B/P 117 /63 , Respiratory Rate 20 , O2 SAT 
96 , Room Air, O2 Flow Rate 2.0 .  

Vital Sign Comment: 



Latest Plummer Fall Score: 60  

Fall Risk: High Risk 

Safety Measures: Call light Within Reach, Bed Alarm Zone 1, Side Rails Side Rails x2, Bed 
position Low and Locked.

Fall Precautions: 

Yellow Socks

Yellow Gown

Door Sign

Patient Fall Education


-------------------------------------------------------------------------------

Addendum: 01/17/21 at 0726 by Nemo Oden RN

-------------------------------------------------------------------------------

HAND-OFF: 

Report given to chadwick parmar.

## 2021-01-17 NOTE — NUR
NURSE NOTES:

patient walks to the hallway asking for food. snacks given. reiterated the importance of 
isolation.

## 2021-01-18 VITALS — SYSTOLIC BLOOD PRESSURE: 152 MMHG | DIASTOLIC BLOOD PRESSURE: 87 MMHG

## 2021-01-18 VITALS — SYSTOLIC BLOOD PRESSURE: 138 MMHG | DIASTOLIC BLOOD PRESSURE: 91 MMHG

## 2021-01-18 VITALS — SYSTOLIC BLOOD PRESSURE: 132 MMHG | DIASTOLIC BLOOD PRESSURE: 85 MMHG

## 2021-01-18 VITALS — DIASTOLIC BLOOD PRESSURE: 91 MMHG | SYSTOLIC BLOOD PRESSURE: 138 MMHG

## 2021-01-18 VITALS — SYSTOLIC BLOOD PRESSURE: 124 MMHG | DIASTOLIC BLOOD PRESSURE: 78 MMHG

## 2021-01-18 VITALS — DIASTOLIC BLOOD PRESSURE: 73 MMHG | SYSTOLIC BLOOD PRESSURE: 108 MMHG

## 2021-01-18 VITALS — DIASTOLIC BLOOD PRESSURE: 78 MMHG | SYSTOLIC BLOOD PRESSURE: 130 MMHG

## 2021-01-18 LAB
ALBUMIN SERPL-MCNC: 3.2 G/DL (ref 3.4–5)
ALBUMIN/GLOB SERPL: 1 {RATIO} (ref 1–2.7)
ALP SERPL-CCNC: 60 U/L (ref 46–116)
ALT SERPL-CCNC: 15 U/L (ref 12–78)
ANION GAP SERPL CALC-SCNC: 5 MMOL/L (ref 5–15)
AST SERPL-CCNC: 18 U/L (ref 15–37)
BILIRUB SERPL-MCNC: 0.5 MG/DL (ref 0.2–1)
BUN SERPL-MCNC: 28 MG/DL (ref 7–18)
CALCIUM SERPL-MCNC: 9 MG/DL (ref 8.5–10.1)
CHLORIDE SERPL-SCNC: 105 MMOL/L (ref 98–107)
CO2 SERPL-SCNC: 33 MMOL/L (ref 21–32)
CREAT SERPL-MCNC: 1 MG/DL (ref 0.55–1.3)
GLOBULIN SER-MCNC: 3.2 G/DL
POTASSIUM SERPL-SCNC: 3.6 MMOL/L (ref 3.5–5.1)
SODIUM SERPL-SCNC: 143 MMOL/L (ref 136–145)

## 2021-01-18 RX ADMIN — LORAZEPAM PRN MG: 1 TABLET ORAL at 08:20

## 2021-01-18 RX ADMIN — APIXABAN SCH MG: 5 TABLET, FILM COATED ORAL at 17:47

## 2021-01-18 RX ADMIN — APIXABAN SCH MG: 5 TABLET, FILM COATED ORAL at 08:13

## 2021-01-18 RX ADMIN — METOPROLOL SUCCINATE SCH MG: 25 TABLET, EXTENDED RELEASE ORAL at 08:12

## 2021-01-18 RX ADMIN — LORAZEPAM PRN MG: 1 TABLET ORAL at 00:52

## 2021-01-18 RX ADMIN — SODIUM CHLORIDE SCH MLS/HR: 0.9 INJECTION INTRAVENOUS at 01:56

## 2021-01-18 RX ADMIN — DEXAMETHASONE SODIUM PHOSPHATE SCH MG: 10 INJECTION INTRAMUSCULAR; INTRAVENOUS at 08:17

## 2021-01-18 NOTE — NUR
NURSE NOTES:

Spoke to Daughter of Kary Jeffries, who gave permission to speak to regarding patient, 
family requests not to have ativan to be given to patient. Will inform Primary nurse.

## 2021-01-18 NOTE — DIAGNOSTIC IMAGING REPORT
EXAM:

  CT Head Without Intravenous Contrast

 

CLINICAL HISTORY:

  AMS

 

TECHNIQUE:

  Axial computed tomography images of the head/brain without intravenous 

contrast.  CTDI is 53.4 mGy and DLP is 1016.2 mGy-cm.  One or more of the 

following dose reduction techniques were used: automated exposure control,

 adjustment of the mA and/or kV according to patient size, use of 

iterative reconstruction technique.

 

COMPARISON:

  1/16/2021.

 

FINDINGS:

  Artifacts:  Significant motion artifact which limits evaluation.

  Brain:  Grossly, no abnormal extra-axial collection is detected.  No 

hemorrhage.  No significant white matter disease.

  Ventricles:  The ventricular system is age appropriate.

  Bones/joints:  Unremarkable.  No acute fracture.

  Soft tissues:  Unremarkable.

  Sinuses:  Visualized sinuses are unremarkable.

  Mastoid air cells:  Mastoid air cells are well pneumatized.

 

IMPRESSION:     

1.  Near nondiagnostic evaluation due to extensive motion artifact.

2.  Grossly, no acute intracranial pathology is detected.

3.  Due to the markedly limited nature of the current study, reimaging 

following sedation or magnetic resonance imaging of the brain should be 

considered for further assessment, particularly if there is concern for 

subtle abnormalities.

## 2021-01-18 NOTE — NUR
CHARGE NURSE NOTE:

Pt's daughter is concerned that Pt is more confused, not talking properly, she wants to talk 
to .  was called, message left, daughter's number provided.

## 2021-01-18 NOTE — NUR
CASE MANAGEMENT:INITIAL REVIEW



1/17/2021



89 YR OLD FEMALE BIBA FROM HOME



CC;ALTERED MENTAL STATUS



SI; COVID PNEUMONIA. A-FIB. AMS. 

98.6   105   12   131/83   98% ON RA

NA- 135   CL- 96   CO2+ 33   BUN+ 31   GLU+ 120

AST+ 49   LAC ACID+ 2.40   BNP+ 796   LIPASE+ 680

UA ~ NEGATIVE

URINE TOX ~ NEGATIVE

COVID RAPID ~ POSITIVE

CXR ~ Borderline cardiac enlargement.  Patchy densities right lung base 

indeterminate for atelectasis or pneumonia. 

HEAD CT ~ No acute intracranial findings

ABD/PELVIS CT ~ No acute findings.  Indeterminate hepatic lesions with follow-up as 

above.  There is also indeterminate prominent pancreatic ductal dilatation as above.

 

IS;IVF NS BOLUS

ROCEPHIN IV

ZITHROMAX IV

DECADRON IV



*****ADMITTED TO MED SURG*****

****MED SURG STATUS****

DCP;PENDING HOSPITAL STAY



****************************************************************************************



CASE MANAGEMENT:CONCURRENT REVIEW



1/18/2021



SI;COVID PNEUMONIA. PANCREATITIS. LACTIC ACIDEMIA.

97.4   74   20   132/85   96% ON RA

WBC- 4.5   CO2+ 33   BUN+ 28   ALB- 3.2



IS;ATIVAN PO Q4 PRN

DECADRON IV QD

ROCEPHIN IV Q24

TOPROL XL PO QD

PROTONIX PO QD

IVF NS @ 100 ML/HR

ELIQUIS PO BID

METHADONE PO QD



*******MED SURG STATUS*******

DCP;FROM HOME

## 2021-01-18 NOTE — PULMONOLOGY PROGRESS NOTE
Subjective


ROS Limited/Unobtainable:  No


Allergies:  


Coded Allergies:  


     No Known Allergies (Unverified , 1/16/21)


All Systems:  reviewed and negative except above


Subjective


calmer


off oxygen 


no distress


daughter confirmed she has help at home





Objective





Last 24 Hour Vital Signs








  Date Time  Temp Pulse Resp B/P (MAP) Pulse Ox O2 Delivery O2 Flow Rate FiO2


 


1/18/21 09:00      Room Air  


 


1/18/21 08:50  68 20 132/85 97   


 


1/18/21 08:20  68 20 132/85 97   


 


1/18/21 08:12  68  132/85    


 


1/18/21 08:00 97.3 74 18 124/78 (93) 96   


 


1/18/21 04:00 97.4 68 20 132/85 (101) 97   


 


1/18/21 01:22  73 20 126/79 99   


 


1/18/21 00:52  75 20 138/91 98   


 


1/18/21 00:00 97.1 75 20 138/91 (107) 98   


 


1/17/21 21:00      Nasal Cannula 2.0 


 


1/17/21 20:50  80 19 119/75 98   


 


1/17/21 20:20  84 19 120/80 98   


 


1/17/21 20:00 97.6 75 18 120/82 (95) 98   


 


1/17/21 14:31  88 17 115/79 96   


 


1/17/21 14:01  88 17 115/79 96   


 


1/17/21 12:00 97.6 88 17 115/79 (91) 96   

















Intake and Output  


 


 1/17/21 1/18/21





 19:00 07:00


 


Intake Total 350 ml 350 ml


 


Balance 350 ml 350 ml


 


  


 


Intake Oral 350 ml 240 ml


 


IV Total  110 ml


 


# Voids 2 2








Objective


deferred due to COVID





Microbiology








 Date/Time


Source Procedure


Growth Status





 


 1/16/21 02:00


Nasopharynx SARS-CoV-2 RdRp Gene Assay - Final Complete


 


 1/16/21 00:20


Blood Blood Culture - Preliminary


NO GROWTH AFTER 48 HOURS Resulted


 


 1/16/21 00:15


Blood Blood Culture - Preliminary


NO GROWTH AFTER 48 HOURS Resulted








Laboratory Tests


1/18/21 06:45: 


Sodium Level 143, Potassium Level 3.6, Chloride Level 105, Carbon Dioxide Level 

33H, Anion Gap 5, Blood Urea Nitrogen 28H, Creatinine 1.0, Estimat Glomerular 

Filtration Rate 52.2, Glucose Level 105, Calcium Level 9.0, Total Bilirubin 0.5,

Aspartate Amino Transf (AST/SGOT) 18, Alanine Aminotransferase (ALT/SGPT) 15, 

Alkaline Phosphatase 60, Total Protein 6.4, Albumin 3.2L, Globulin 3.2, 

Albumin/Globulin Ratio 1.0, Lipase 268, Thyroid Stimulating Hormone (TSH) 0.522





Current Medications








 Medications


  (Trade)  Dose


 Ordered  Sig/Keke


 Route


 PRN Reason  Start Time


 Stop Time Status Last Admin


Dose Admin


 


 Acetaminophen


  (Tylenol)  650 mg  Q4H  PRN


 ORAL


 For Pain  1/16/21 07:15


 2/15/21 07:14   





 


 Al Hydroxide/Mg


 Hydroxide


  (Mylanta)  30 ml  Q4H  PRN


 ORAL


 STOMACH UPSET  1/16/21 07:15


 2/15/21 07:14   





 


 Apixaban


  (Eliquis)  5 mg  BID


 ORAL


   1/16/21 09:00


 4/16/21 08:59  1/18/21 08:13





 


 Ceftriaxone


 Sodium 1 gm/


 Dextrose  55 ml @ 


 110 mls/hr  Q24H


 IVPB


   1/17/21 02:00


 1/24/21 01:59  1/18/21 01:56





 


 Dexamethasone


 Sodium Phosphate


  (Decadron 10mg/


 ml Inj)  6 mg  DAILY


 IV


   1/17/21 09:00


 1/25/21 09:01  1/18/21 08:17





 


 Lorazepam


  (Ativan)  1 mg  Q4H  PRN


 ORAL


 For Anxiety  1/17/21 09:30


 1/24/21 09:29  1/18/21 08:20





 


 Methadone HCl


  (Methadone HCl)  10 mg  DAILY


 ORAL


   1/16/21 09:00


 1/23/21 08:59  1/18/21 08:12





 


 Metoprolol


 Succinate


  (Toprol XL)  25 mg  DAILY


 ORAL


   1/16/21 09:00


 4/16/21 08:59  1/18/21 08:12





 


 Pantoprazole


  (Protonix)  40 mg  DAILY


 ORAL


   1/16/21 09:00


 2/15/21 08:59  1/18/21 08:12














Assessment/Plan


Assessment/Plan





IMPRESSION


COVID pneumonia


hypertension


Asthma


? pancreatitis


elevated BNP


lactic acidemia


ho Atrial fib








PLAN


Decadron- dc for now; off oxygen 


repeat pancreatic enzyme normal 


repeat amylase and lipase


no oxygen needed


aspirin on discharge


monitor after dc


impression, plan, and exam edited and reviewed in detail


care discussed with Sylvester Sprague MD           Jan 18, 2021 11:34

## 2021-01-18 NOTE — NUR
NURSE NOTES:

1945 Caregiver stated she will be coming soon to  patient. Spoke with the daughter on 
the phone and she raised concerns regarding patient's increased confusion and hallucinations 
over the phone.

1950 Dr. Osorio called RN and asked how the patient was. Dr. Osorio was made aware that 
patient's gait was unsteady and that the daughter was was concern regarding her new onset 
confusion. According to report, patient's baseline was slightly confused but unable to 
assess at this time due to language barrier. Dr. Osorio ordered to hold DC tonight and 
ordered stat CT of the head and PT eval tomorrow morning. Caregiver came to  patient 
and explained to her that  decided to hold discharge today.

2100 Patient came back from CT of the head. Awaiting for result.

2145 Dr. Osorio made aware of CT head result and stated patient will be discharged in the 
AM. Family (daughter) made aware.

## 2021-01-18 NOTE — NUR
NURSE NOTES:

Received report from Yumiko KEATING, patient a/a/o x2 laying in bed with no signs of distress or 
other issues at this time. patient ambulates to the bathroom with unsteady gait, patient 
needs supervision. patient is COVID positive on RA, asymptomatic. IV on the left FA gauge#22 
running 100ml/hr. call light within reach, bed in lowest position. side rales up x3 and soft 
BLE wrist restrains. I will f/u as needed.

plan: possible d/c today

## 2021-01-18 NOTE — GENERAL PROGRESS NOTE
Subjective


ROS Limited/Unobtainable:  Yes


Constitutional:  Reports: malaise, weakness


HEENT:  Reports: no symptoms


Cardiovascular:  Reports: no symptoms


Respiratory:  Reports: no symptoms


Gastrointestinal/Abdominal:  Reports: no symptoms


Genitourinary:  Reports: no symptoms


Neurologic/Psychiatric:  Reports: no symptoms


Endocrine:  Reports: no symptoms


Hematologic/Lymphatic:  Reports: no symptoms


Allergies:  


Coded Allergies:  


     No Known Allergies (Unverified , 1/16/21)


All Systems:  reviewed and negative except above


Subjective


no complaints. confused. states shes going home. ambulating around the room. 

sats nml on room air





Objective





Last 24 Hour Vital Signs








  Date Time  Temp Pulse Resp B/P (MAP) Pulse Ox O2 Delivery O2 Flow Rate FiO2


 


1/18/21 12:00 97.2 91 18 108/73 (85) 93   


 


1/18/21 09:00      Room Air  


 


1/18/21 08:50  68 20 132/85 97   


 


1/18/21 08:20  68 20 132/85 97   


 


1/18/21 08:12  68  132/85    


 


1/18/21 08:00 97.3 74 18 124/78 (93) 96   


 


1/18/21 04:00 97.4 68 20 132/85 (101) 97   


 


1/18/21 01:22  73 20 126/79 99   


 


1/18/21 00:52  75 20 138/91 98   


 


1/18/21 00:00 97.1 75 20 138/91 (107) 98   


 


1/17/21 21:00      Nasal Cannula 2.0 


 


1/17/21 20:50  80 19 119/75 98   


 


1/17/21 20:20  84 19 120/80 98   


 


1/17/21 20:00 97.6 75 18 120/82 (95) 98   


 


1/17/21 14:31  88 17 115/79 96   

















Intake and Output  


 


 1/17/21 1/18/21





 19:00 07:00


 


Intake Total 350 ml 350 ml


 


Balance 350 ml 350 ml


 


  


 


Intake Oral 350 ml 240 ml


 


IV Total  110 ml


 


# Voids 2 2








Laboratory Tests


1/18/21 06:45: 


Sodium Level 143, Potassium Level 3.6, Chloride Level 105, Carbon Dioxide Level 

33H, Anion Gap 5, Blood Urea Nitrogen 28H, Creatinine 1.0, Estimat Glomerular 

Filtration Rate 52.2, Glucose Level 105, Calcium Level 9.0, Total Bilirubin 0.5,

Aspartate Amino Transf (AST/SGOT) 18, Alanine Aminotransferase (ALT/SGPT) 15, 

Alkaline Phosphatase 60, Total Protein 6.4, Albumin 3.2L, Globulin 3.2, 

Albumin/Globulin Ratio 1.0, Lipase 268, Thyroid Stimulating Hormone (TSH) 0.522


Height (Feet):  5


Height (Inches):  3.00


Weight (Pounds):  145


Objective


General Appearance:  WD/WN, alert


Neck:  supple


Cardiovascular:  normal rate, regular rhythm


Respiratory/Chest:  chest wall non-tender, lungs clear, normal breath sounds, no

respiratory distress, no accessory muscle use


Abdomen:  normal bowel sounds, non tender, soft, no organomegaly


Edema:  no edema noted Arm (L), no edema noted Arm (R), no edema noted Leg (L), 

no edema noted Leg (R)





Assessment/Plan


Problem List:  


(1) COVID-19


ICD Codes:  U07.1 - COVID-19


SNOMED:  364986970


(2) Altered mental status


ICD Codes:  R41.82 - Altered mental status, unspecified


SNOMED:  814063397


(3) Pneumonia


ICD Codes:  J18.9 - Pneumonia, unspecified organism


SNOMED:  095696775


(4) Afib


ICD Codes:  I48.91 - Unspecified atrial fibrillation


SNOMED:  34734261


Status:  stable


Assessment/Plan:


o2 as needed


steroids


ID eval


?dc methaone


eliquis


monitor for bleeding


pt/ot











Seun Angela MD              Jan 18, 2021 14:28

## 2021-01-18 NOTE — NUR
NURSE HAND-OFF: 



Important Events on Shift:

Patient Status: full code/ stable

Diet: regular



Pending Orders: []

Pending Results/Labs:[]

Pending MD notification:[]



Latest Vital Signs: Temperature 97.6 , Pulse 72 , B/P 130 /78 , Respiratory Rate 19 , O2 SAT 
92 , Room Air, O2 Flow Rate 2.0 .  

Vital Sign Comment: stable



Latest Plummer Fall Score: 60  

Fall Risk: High Risk 

Safety Measures: Call light Within Reach, Bed Alarm Zone 1, Side Rails Side Rails x2, Bed 
position Low and Locked.

Fall Precautions: 

Yellow Socks

Yellow Gown

Door Sign

Patient Fall Education



Report given to Liz KEATING. patient in stable condition. 

- plan to d/c home today. 

- patients daughter and care giver are aware. Laura the caregiver will provide 
transportation. [phone]

- IV removed

## 2021-01-18 NOTE — NUR
NURSE HAND-OFF: 



Important Events on Shift: episodes of restlessness

Patient Status: stable

Diet: regular



Pending Orders: 

Pending Results/Labs:

Pending MD notification:



Latest Vital Signs: Temperature 97.4 , Pulse 68 , B/P 132 /85 , Respiratory Rate 20 , O2 SAT 
97 , Room Air, O2 Flow Rate 2.0 .  

Vital Sign Comment: 



Latest Plummer Fall Score: 60  

Fall Risk: High Risk 

Safety Measures: Call light Within Reach, Bed Alarm Zone 1, Side Rails Side Rails x2, Bed 
position Low and Locked.

Fall Precautions: 

Yellow Socks

Yellow Gown

Door Sign

Patient Fall Education


-------------------------------------------------------------------------------

Addendum: 01/18/21 at 0729 by Nemo Oden RN

-------------------------------------------------------------------------------

HAND-OFF: 

Report given to chadwick andersen.

## 2021-01-18 NOTE — NUR
NURSE NOTES:

Received patient for discharge. Bed alarm was activated, patient was attempting to get out 
of bed, bilateral soft wrist restraints was removed. Patient verbalized wanting to go to the 
bathroom. Patient gait unsteady, assisted with max assist to the bathroom and assisted back 
to bed. Patient appears confused, Farsi speaking, but able to be redirected. Bilateral soft 
wrist restraints were reapplied for safety and bed alarm was on. Patient was for discharge, 
awaiting for caregiver to  patient. Will continue to monitor.

## 2021-01-19 VITALS — SYSTOLIC BLOOD PRESSURE: 130 MMHG | DIASTOLIC BLOOD PRESSURE: 89 MMHG

## 2021-01-19 VITALS — SYSTOLIC BLOOD PRESSURE: 146 MMHG | DIASTOLIC BLOOD PRESSURE: 86 MMHG

## 2021-01-19 VITALS — SYSTOLIC BLOOD PRESSURE: 129 MMHG | DIASTOLIC BLOOD PRESSURE: 77 MMHG

## 2021-01-19 RX ADMIN — APIXABAN SCH MG: 5 TABLET, FILM COATED ORAL at 09:00

## 2021-01-19 RX ADMIN — METOPROLOL SUCCINATE SCH MG: 25 TABLET, EXTENDED RELEASE ORAL at 09:41

## 2021-01-19 RX ADMIN — DEXAMETHASONE SODIUM PHOSPHATE SCH MG: 10 INJECTION INTRAMUSCULAR; INTRAVENOUS at 09:45

## 2021-01-19 RX ADMIN — SODIUM CHLORIDE SCH MLS/HR: 0.9 INJECTION INTRAVENOUS at 01:02

## 2021-01-19 NOTE — NUR
NURSE HAND-OFF: 



Important Events on Shift:[DC postponed until today.CT head negative. for PT eval]

Patient Status: [eating]

Diet: [Regular soft easy chew]



Pending Orders: []

Pending Results/Labs:[]

Pending MD notification:[]



Latest Vital Signs: Temperature 97.8 , Pulse 98 , B/P 129 /77 , Respiratory Rate 18 , O2 SAT 
93 , Room Air, O2 Flow Rate 2.0 .  

Vital Sign Comment: []



Latest Plummer Fall Score: 60  

Fall Risk: High Risk 

Safety Measures: Call light Within Reach, Bed Alarm Zone 2, Side Rails Side Rails x2, Bed 
position Low and Locked.

Fall Precautions: 

Yellow Socks

Yellow Gown

Door Sign

Patient Fall Education



Report given to [Gonzalo DEAN RN].

## 2021-01-19 NOTE — NUR
P.T NOTE:

P.T EVALUATION COMPLETED AND TX INITIATED. PLEASE REFER TO P.T EVALUATION FOR CURRENT 
FUNCTIONAL STATUS.

## 2021-01-19 NOTE — NUR
NURSE NOTES:



Received report from Liz KEATING. Patient in bed, confused but pleasant, Farci speaking with 
limited English. No acute distress noted, on room air, no SOB. IV to left hand saline 
locked. Bilateral soft wrist restraints. Fall precautions in place, sign at door, side rails 
up x3, bed in lowest position, call light in reach, bed locked.

## 2021-01-19 NOTE — PULMONOLOGY PROGRESS NOTE
Subjective


ROS Limited/Unobtainable:  No


Allergies:  


Coded Allergies:  


     No Known Allergies (Unverified , 1/16/21)


All Systems:  reviewed and negative except above


Subjective


calmer


off oxygen 


no distress


daughter confirmed she has help at home


CT head negative





Objective





Last 24 Hour Vital Signs








  Date Time  Temp Pulse Resp B/P (MAP) Pulse Ox O2 Delivery O2 Flow Rate FiO2


 


1/19/21 09:41  87  146/86    


 


1/19/21 08:00 97.7 87 18 146/86 (106) 95   


 


1/19/21 04:00 97.8 98 18 129/77 (94) 93   


 


1/18/21 23:35 97.3 70 18 138/91 (107) 93   


 


1/18/21 21:00      Room Air  


 


1/18/21 20:00 97.3 86 18 152/87 (108) 92   


 


1/18/21 16:00 97.6 72 19 130/78 (95) 92   


 


1/18/21 12:00 97.2 91 18 108/73 (85) 93   

















Intake and Output  


 


 1/18/21 1/19/21





 19:00 07:00


 


Intake Total 610 ml 305 ml


 


Balance 610 ml 305 ml


 


  


 


Intake Oral 610 ml 250 ml


 


IV Total  55 ml


 


# Voids 3 3








Objective


deferred due to COVID





Current Medications








 Medications


  (Trade)  Dose


 Ordered  Sig/Keke


 Route


 PRN Reason  Start Time


 Stop Time Status Last Admin


Dose Admin


 


 Acetaminophen


  (Tylenol)  650 mg  Q4H  PRN


 ORAL


 For Pain  1/16/21 07:15


 2/15/21 07:14   





 


 Al Hydroxide/Mg


 Hydroxide


  (Mylanta)  30 ml  Q4H  PRN


 ORAL


 STOMACH UPSET  1/16/21 07:15


 2/15/21 07:14   





 


 Apixaban


  (Eliquis)  5 mg  BID


 ORAL


   1/16/21 09:00


 4/16/21 08:59  1/19/21 09:00





 


 Ceftriaxone


 Sodium 1 gm/


 Dextrose  55 ml @ 


 110 mls/hr  Q24H


 IVPB


   1/17/21 02:00


 1/24/21 01:59  1/19/21 01:02





 


 Dexamethasone


 Sodium Phosphate


  (Decadron 10mg/


 ml Inj)  6 mg  DAILY


 IV


   1/17/21 09:00


 1/25/21 09:01  1/19/21 09:45





 


 Lorazepam


  (Ativan)  1 mg  Q4H  PRN


 ORAL


 For Anxiety  1/17/21 09:30


 1/24/21 09:29  1/18/21 08:20





 


 Methadone HCl


  (Methadone HCl)  10 mg  DAILY


 ORAL


   1/16/21 09:00


 1/23/21 08:59  1/19/21 09:44





 


 Metoprolol


 Succinate


  (Toprol XL)  25 mg  DAILY


 ORAL


   1/16/21 09:00


 4/16/21 08:59  1/19/21 09:41





 


 Pantoprazole


  (Protonix)  40 mg  DAILY


 ORAL


   1/16/21 09:00


 2/15/21 08:59  1/19/21 09:41














Assessment/Plan


Assessment/Plan





IMPRESSION


COVID pneumonia


hypertension


Asthma


elevated BNP


lactic acidemia


ho Atrial fib








PLAN


Decadron- dc for now; off oxygen 


repeat pancreatic enzyme normal 


repeat amylase and lipase


no oxygen needed


aspirin on discharge


monitor after dc


will call HH


daughter aware and is ok with dc


impression, plan, and exam edited and reviewed in detail


care discussed with Sylvester Sprague MD           Jan 19, 2021 11:01

## 2021-01-19 NOTE — NUR
NURSE NOTES:

patient was seen by PT. patient was able to walk with walker, needs some supervision  for 
safety d/t patient trying to move fast. notified Dr. Osorio and MD ordered [facility] 
[facility] upon discharge. Spoke to Kary Devlin/daughter, she knows the patient's base 
line, also the patient has a home care giver who visit the patient daily

## 2021-01-19 NOTE — NUR
*-*DISCHARGE PLAN*-*



PATIENT HAS BEEN ACCEPTED WITH:



[facility]

 P: [phone]

S/W GALI, WILL SERVICE THIS PATIENT UPON DISCHARGE.

## 2021-01-19 NOTE — NUR
*-*DISCHARGE PLANNING*-*



PATIENT HAS BEEN REFERRED TO:



[facility]

 P: [phone]

S/W [facility], WILL CALL BACK AFTER REVIEW.

## 2021-01-19 NOTE — GENERAL PROGRESS NOTE
Subjective


ROS Limited/Unobtainable:  No


Constitutional:  Reports: malaise, weakness


HEENT:  Reports: no symptoms


Cardiovascular:  Reports: no symptoms


Respiratory:  Reports: no symptoms


Gastrointestinal/Abdominal:  Reports: no symptoms


Genitourinary:  Reports: no symptoms


Neurologic/Psychiatric:  Reports: anxiety


Endocrine:  Reports: no symptoms


Hematologic/Lymphatic:  Reports: no symptoms


Allergies:  


Coded Allergies:  


     No Known Allergies (Unverified , 1/16/21)


All Systems:  reviewed and negative except above


Subjective


no complaints. wants to go home. per family pt is confused. unsteady when 

ambulating. no fevers or chills. no cough. tolerating po





Objective





Last 24 Hour Vital Signs








  Date Time  Temp Pulse Resp B/P (MAP) Pulse Ox O2 Delivery O2 Flow Rate FiO2


 


1/19/21 08:00 97.7 87 18 146/86 (106) 95   


 


1/19/21 04:00 97.8 98 18 129/77 (94) 93   


 


1/18/21 23:35 97.3 70 18 138/91 (107) 93   


 


1/18/21 21:00      Room Air  


 


1/18/21 20:00 97.3 86 18 152/87 (108) 92   


 


1/18/21 16:00 97.6 72 19 130/78 (95) 92   


 


1/18/21 12:00 97.2 91 18 108/73 (85) 93   

















Intake and Output  


 


 1/18/21 1/19/21





 19:00 07:00


 


Intake Total 610 ml 305 ml


 


Balance 610 ml 305 ml


 


  


 


Intake Oral 610 ml 250 ml


 


IV Total  55 ml


 


# Voids 3 3








Height (Feet):  5


Height (Inches):  3.00


Weight (Pounds):  145


Objective


General Appearance:  WD/WN, alert


Neck:  supple


Cardiovascular:  normal rate, regular rhythm


Respiratory/Chest:  chest wall non-tender, lungs clear, normal breath sounds, no

respiratory distress, no accessory muscle use


Abdomen:  normal bowel sounds, non tender, soft, no organomegaly


Edema:  no edema noted Arm (L), no edema noted Arm (R), no edema noted Leg (L), 

no edema noted Leg (R)





Assessment/Plan


Problem List:  


(1) COVID-19


ICD Codes:  U07.1 - COVID-19


SNOMED:  421319935


(2) Altered mental status


ICD Codes:  R41.82 - Altered mental status, unspecified


SNOMED:  206637024


(3) Pneumonia


ICD Codes:  J18.9 - Pneumonia, unspecified organism


SNOMED:  124475878


(4) Afib


ICD Codes:  I48.91 - Unspecified atrial fibrillation


SNOMED:  44719327


Status:  stable


Assessment/Plan:


o2 as needed


eliquis


monitor for bleeding


pt/ot


monitor neuro status- ?confused- ?due to steroids ?to to opiates











Seun Angela MD              Jan 19, 2021 09:05

## 2021-01-19 NOTE — NUR
NURSE NOTES:

patient was discharge to home via care giver, sudha's car.  no respiratory distress noted on 
room air. no pain at this time. provided dc packet and  information of covid care at home. 
checked and counted belongings. missing belonging paper in chart. patient has  dentures in 
cup. wearing her clothes. no cell phone. no glasses. patient unable to sign. spoke to Sudha, 
Care giver. the patient needs stay home for self isolation. resume home medications. patient 
dc with [facility] but patient already has [facility]. care giver will call [facility] 
and cancel it.

## 2021-01-20 NOTE — DISCHARGE SUMMARY
Discharge Summary


Discharge Summary


_


Date of admission: 1/16/2021





Date of discharge: 1/19/2021





Discharged by Dr. Osorio





History of Present Illness and Brief Hospital Course


Ms. Rubio is an 89-year-old female with history of hypertension, asthma, 

chronic pain syndrome, and atrial fibrillation, who presented to the ED for 

evaluation of lethargy and altered level of consciousness.  According to the 

family patient appeared lethargic and altered for the past several hours prior 

to arrival.  EKG showed atrial fibrillation at rate of 102.  Her chest x-ray was

notable for patchy densities in the right lung base without pneumothorax or 

pleural effusion.  Her CT of head revealed no acute intracranial findings.  Her 

CT abdomen pelvis showed no acute findings either.  Patient did test positive 

for COVID-19 via rapid gene assay in the ER and was admitted to the hospital for

further management.





Given her hypoxia on arrival secondary to COVID-19 pneumonia, she was started 

with Decadron, a short course of antibiotics, and supplemental oxygen as needed.

 Over the next few days she was able to be weaned off of oxygen without any 

distress.  Given her history of atrial fibrillation, DVT prophylaxis was 

initiated during admission.





Given elevated LFTs on arrival, her amylase and lipase were monitored.


Over the next few days, her status improved.  She was also evaluated by a 

physical therapist.  Patient was able to walk with a walker.  After confirming 

that she has a caregiver coming to her home, she was discharged home with home 

health on 1/19/2021.





consultants:


Pulmonology Dr. Osorio





Discharge Condition


Improved and stable





Discharge Activity


Supervision recommended





Final diagnoses


COVID-19


Encephalopathy


Pneumonia


Atrial fibrillation


History of asthma


Elevated BNP


Lactic acidemia





I have been assigned to dictate discharge summary for this account.


I was not involved in the patient's management











Shad Eldridge                 Jan 20, 2021 16:14